# Patient Record
(demographics unavailable — no encounter records)

---

## 2025-05-05 NOTE — PHYSICAL EXAM
[Normal Sclera/Conjunctiva] : normal sclera/conjunctiva [Normal Outer Ear/Nose] : the outer ears and nose were normal in appearance [Normal] : normal rate, regular rhythm, normal S1 and S2 and no murmur heard [No Edema] : there was no peripheral edema [No Extremity Clubbing/Cyanosis] : no extremity clubbing/cyanosis [Soft] : abdomen soft [Non Tender] : non-tender [Non-distended] : non-distended [No Masses] : no abdominal mass palpated [Normal Affect] : the affect was normal [Alert and Oriented x3] : oriented to person, place, and time [Normal Insight/Judgement] : insight and judgment were intact

## 2025-05-05 NOTE — HISTORY OF PRESENT ILLNESS
[FreeTextEntry1] : Glucose monitoring [de-identified] : Pt comes in for glucose monitoring discussion. She noticed that some things she eats make her rly tired after she eats them. Last night felt very tired while eating dinner, was having chicken and sweet potato. Other day was eating whole wheat slick, brown rice, chicken, and kale, and then immediately felt tired. Is addicted to caffeine. Has been getting up earlier than normal and exercising more.

## 2025-05-05 NOTE — HISTORY OF PRESENT ILLNESS
[FreeTextEntry1] : Glucose monitoring [de-identified] : Pt comes in for glucose monitoring discussion. She noticed that some things she eats make her rly tired after she eats them. Last night felt very tired while eating dinner, was having chicken and sweet potato. Other day was eating whole wheat slick, brown rice, chicken, and kale, and then immediately felt tired. Is addicted to caffeine. Has been getting up earlier than normal and exercising more.

## 2025-05-05 NOTE — ASSESSMENT
[FreeTextEntry1] : # Episodic fatigue, hyperglycemia Coinciding with when she started waking up earlier to exercise Given she notices it while she's eating will evaluate for signs of hypoglycemia - f/u labs Pt interested in trying a CGM - we discussed that with her lack of significant medical conditions it would not be covered by insurance, she can try an OTC CGM. She will let me know those results if she gets that.  # HLD Pt has been adhering to healthy diet f/u lipid panel  f/u pending lab results   Visit conducted as part of ongoing, longitudinal medical care for patient's medical and other issues.